# Patient Record
Sex: FEMALE | Race: WHITE | NOT HISPANIC OR LATINO | URBAN - METROPOLITAN AREA
[De-identification: names, ages, dates, MRNs, and addresses within clinical notes are randomized per-mention and may not be internally consistent; named-entity substitution may affect disease eponyms.]

---

## 2017-02-21 ENCOUNTER — APPOINTMENT (RX ONLY)
Dept: URBAN - METROPOLITAN AREA CLINIC 38 | Facility: CLINIC | Age: 47
Setting detail: DERMATOLOGY
End: 2017-02-21

## 2017-02-21 DIAGNOSIS — L57.0 ACTINIC KERATOSIS: ICD-10-CM

## 2017-02-21 DIAGNOSIS — D22 MELANOCYTIC NEVI: ICD-10-CM

## 2017-02-21 DIAGNOSIS — Z80.8 FAMILY HISTORY OF MALIGNANT NEOPLASM OF OTHER ORGANS OR SYSTEMS: ICD-10-CM

## 2017-02-21 DIAGNOSIS — L82.0 INFLAMED SEBORRHEIC KERATOSIS: ICD-10-CM

## 2017-02-21 PROBLEM — D48.5 NEOPLASM OF UNCERTAIN BEHAVIOR OF SKIN: Status: ACTIVE | Noted: 2017-02-21

## 2017-02-21 PROBLEM — D22.5 MELANOCYTIC NEVI OF TRUNK: Status: ACTIVE | Noted: 2017-02-21

## 2017-02-21 PROCEDURE — ? BENIGN DESTRUCTION

## 2017-02-21 PROCEDURE — ? PRODUCT LINE (ANTI-AGING)

## 2017-02-21 PROCEDURE — ? COUNSELING

## 2017-02-21 PROCEDURE — 17000 DESTRUCT PREMALG LESION: CPT | Mod: 59

## 2017-02-21 PROCEDURE — 11100: CPT | Mod: 59

## 2017-02-21 PROCEDURE — 17110 DESTRUCTION B9 LES UP TO 14: CPT

## 2017-02-21 PROCEDURE — ? BIOPSY BY SHAVE METHOD

## 2017-02-21 PROCEDURE — ? LIQUID NITROGEN

## 2017-02-21 PROCEDURE — 99213 OFFICE O/P EST LOW 20 MIN: CPT | Mod: 25

## 2017-02-21 ASSESSMENT — LOCATION SIMPLE DESCRIPTION DERM
LOCATION SIMPLE: LEFT PRETIBIAL REGION
LOCATION SIMPLE: LEFT UPPER BACK
LOCATION SIMPLE: UPPER BACK
LOCATION SIMPLE: LEFT TEMPLE

## 2017-02-21 ASSESSMENT — LOCATION ZONE DERM
LOCATION ZONE: FACE
LOCATION ZONE: LEG
LOCATION ZONE: TRUNK

## 2017-02-21 ASSESSMENT — LOCATION DETAILED DESCRIPTION DERM
LOCATION DETAILED: SUPERIOR THORACIC SPINE
LOCATION DETAILED: LEFT SUPERIOR UPPER BACK
LOCATION DETAILED: LEFT PROXIMAL PRETIBIAL REGION
LOCATION DETAILED: LEFT CENTRAL TEMPLE

## 2017-02-21 NOTE — PROCEDURE: BIOPSY BY SHAVE METHOD
Consent: Verbal consent was obtained and risks were reviewed including but not limited to scarring, infection, bleeding, scabbing, incomplete removal, and allergy to anesthesia.\\n\\n(For billing): It's appropriate to screen patients twice yearly or sometimes more who have a history of skin cancer.\\nThe skin check is a completely separate identifiable service. It stands on its own. Many visits are just for this and\\nno procedures are done. If a procedure is done on a separate lesion that the patient complains about, it is not\\nrelated to the fact that a full skin check would be done anyway because of their higher risk of skin cancer\\nrecurrence or new primary.

## 2017-02-21 NOTE — PROCEDURE: BENIGN DESTRUCTION
Post-Care Instructions: I reviewed with the patient in detail post-care instructions. Patient is to wear sunprotection, and avoid picking at any of the treated lesions. Pt may apply Vaseline to crusted or scabbing areas.
Consent: The patient's consent was obtained including but not limited to risks of crusting, scabbing, blistering, scarring, darker or lighter pigmentary change, recurrence, incomplete removal and infection.
Render Post-Care Instructions In Note?: no
Treatment Number (Will Not Render If 0): 0
Medical Necessity Clause: This procedure was medically necessary because the lesions that were treated were: bleeding
Detail Level: Detailed
Medical Necessity Information: It is in your best interest to select a reason for this procedure from the list below. All of these items fulfill various CMS LCD requirements except the new and changing color options.

## 2017-02-21 NOTE — PROCEDURE: BIOPSY BY SHAVE METHOD
Problem: Inpatient Physical Therapy  Goal: Transfer Training Goal 1 LTG- PT  Outcome: Unable to achieve outcome(s) by discharge Date Met:  01/22/17 01/20/17 0913 01/21/17 1350 01/22/17 1607   Transfer Training PT LTG   Transfer Training PT LTG, Date Established 01/14/17 --  --    Transfer Training PT LTG, Time to Achieve --  --  by discharge   Transfer Training PT LTG, Activity Type all transfers --  --    Transfer Training PT LTG, [place] Level conditional independence --  --    Transfer Training PT LTG, Assist Device walker, rolling --  --    Transfer Training PT LTG, Date Goal Reviewed --  01/21/17 --    Transfer Training PT LTG, Outcome --  --  goal not met   Transfer Training PT LTG, Reason Goal Not Met --  --  discharged from facility       Goal: Gait Training Goal LTG- PT  Outcome: Unable to achieve outcome(s) by discharge Date Met:  01/22/17 01/20/17 0913 01/21/17 1350 01/22/17 1607   Gait Training PT LTG   Gait Training Goal PT LTG, Date Established --  --  01/20/17   Gait Training Goal PT LTG, Time to Achieve --  --  by discharge   Gait Training Goal PT LTG, [place] Level conditional independence --  --    Gait Training Goal PT LTG, Assist Device walker, rolling --  --    Gait Training Goal PT LTG, Distance to Achieve 150 --  --    Gait Training Goal PT LTG, Date Goal Reviewed --  01/21/17 --    Gait Training Goal PT LTG, Outcome --  --  goal not met   Gait Training Goal PT LTG, Reason Goal Not Met --  --  discharged from facility       Goal: Stair Training Goal LTG- PT  Outcome: Unable to achieve outcome(s) by discharge Date Met:  01/22/17 01/14/17 0913 01/21/17 1350 01/22/17 1607   Stair Training PT LTG   Stair Training Goal PT LTG, Date Established 01/14/17 --  --    Stair Training Goal PT LTG, Time to Achieve by discharge --  --    Stair Training Goal PT LTG, Number of Steps 2 --  --    Stair Training Goal PT LTG, [place] Level independent --  --    Stair Training Goal PT LTG,  Assist Device 2 handrails --  --    Stair Training Goal PT LTG, Date Goal Reviewed --  01/21/17 --    Stair Training Goal PT LTG, Outcome --  --  goal not met   Stair Training Goal PT LTG, Reason Goal Not Met --  --  discharged from facility         01/14/17 0913 01/21/17 1350 01/22/17 1607   Stair Training PT LTG   Stair Training Goal PT LTG, Date Established 01/14/17 --  --    Stair Training Goal PT LTG, Time to Achieve by discharge --  --    Stair Training Goal PT LTG, Number of Steps 2 --  --    Stair Training Goal PT LTG, [place] Level independent --  --    Stair Training Goal PT LTG, Assist Device 2 handrails --  --    Stair Training Goal PT LTG, Date Goal Reviewed --  01/21/17 --    Stair Training Goal PT LTG, Outcome --  --  goal not met   Stair Training Goal PT LTG, Reason Goal Not Met --  --  discharged from facility            Curettage Text: The wound bed was treated with curettage after the biopsy was performed.

## 2017-02-21 NOTE — HPI: EVALUATION OF SKIN LESION(S)
Hpi Title: Evaluation of Skin Lesions
How Severe Are Your Spot(S)?: mild
Have Your Spot(S) Been Treated In The Past?: has not been treated
Family Member: Father

## 2018-02-27 ENCOUNTER — APPOINTMENT (RX ONLY)
Dept: URBAN - METROPOLITAN AREA CLINIC 38 | Facility: CLINIC | Age: 48
Setting detail: DERMATOLOGY
End: 2018-02-27

## 2018-02-27 DIAGNOSIS — L82.1 OTHER SEBORRHEIC KERATOSIS: ICD-10-CM

## 2018-02-27 DIAGNOSIS — Z80.8 FAMILY HISTORY OF MALIGNANT NEOPLASM OF OTHER ORGANS OR SYSTEMS: ICD-10-CM

## 2018-02-27 DIAGNOSIS — D22 MELANOCYTIC NEVI: ICD-10-CM

## 2018-02-27 PROBLEM — D22.5 MELANOCYTIC NEVI OF TRUNK: Status: ACTIVE | Noted: 2018-02-27

## 2018-02-27 PROCEDURE — ? COUNSELING

## 2018-02-27 PROCEDURE — 99213 OFFICE O/P EST LOW 20 MIN: CPT

## 2018-02-27 ASSESSMENT — LOCATION DETAILED DESCRIPTION DERM
LOCATION DETAILED: LEFT INFERIOR MEDIAL UPPER BACK
LOCATION DETAILED: SUPERIOR THORACIC SPINE

## 2018-02-27 ASSESSMENT — LOCATION ZONE DERM: LOCATION ZONE: TRUNK

## 2018-02-27 ASSESSMENT — LOCATION SIMPLE DESCRIPTION DERM
LOCATION SIMPLE: LEFT UPPER BACK
LOCATION SIMPLE: UPPER BACK

## 2019-03-05 ENCOUNTER — APPOINTMENT (RX ONLY)
Dept: URBAN - METROPOLITAN AREA CLINIC 38 | Facility: CLINIC | Age: 49
Setting detail: DERMATOLOGY
End: 2019-03-05

## 2019-03-05 DIAGNOSIS — L82.1 OTHER SEBORRHEIC KERATOSIS: ICD-10-CM

## 2019-03-05 DIAGNOSIS — Z80.8 FAMILY HISTORY OF MALIGNANT NEOPLASM OF OTHER ORGANS OR SYSTEMS: ICD-10-CM

## 2019-03-05 DIAGNOSIS — D22 MELANOCYTIC NEVI: ICD-10-CM

## 2019-03-05 PROBLEM — D22.5 MELANOCYTIC NEVI OF TRUNK: Status: ACTIVE | Noted: 2019-03-05

## 2019-03-05 PROCEDURE — ? COUNSELING

## 2019-03-05 PROCEDURE — 99213 OFFICE O/P EST LOW 20 MIN: CPT | Mod: 25

## 2019-03-05 PROCEDURE — ? BIOPSY BY SHAVE METHOD

## 2019-03-05 PROCEDURE — 11102 TANGNTL BX SKIN SINGLE LES: CPT

## 2019-03-05 ASSESSMENT — LOCATION SIMPLE DESCRIPTION DERM
LOCATION SIMPLE: LEFT UPPER BACK
LOCATION SIMPLE: RIGHT UPPER BACK
LOCATION SIMPLE: UPPER BACK

## 2019-03-05 ASSESSMENT — LOCATION DETAILED DESCRIPTION DERM
LOCATION DETAILED: LEFT INFERIOR MEDIAL UPPER BACK
LOCATION DETAILED: RIGHT SUPERIOR UPPER BACK
LOCATION DETAILED: SUPERIOR THORACIC SPINE

## 2019-03-05 ASSESSMENT — LOCATION ZONE DERM: LOCATION ZONE: TRUNK

## 2019-03-05 NOTE — PROCEDURE: BIOPSY BY SHAVE METHOD
Lab: [id number]
Consent: Verbal consent was obtained and risks were reviewed including but not limited to scarring, infection, bleeding, scabbing, incomplete removal, and allergy to anesthesia.\\n\\n(For billing): It's appropriate to screen patients twice yearly or sometimes more who have a history of skin cancer.\\nThe skin check is a completely separate identifiable service. It stands on its own. Many visits are just for this and\\nno procedures are done. If a procedure is done on a separate lesion that the patient complains about, it is not\\nrelated to the fact that a full skin check would be done anyway because of their higher risk of skin cancer\\nrecurrence or new primary.
Biopsy Type: H and E
Destruction After The Procedure: No
Hemostasis: Electrocautery
Anesthesia Type: 1% lidocaine without epinephrine
Electrodesiccation Text: The wound bed was treated with electrodesiccation after the biopsy was performed.
Wound Care: Bacitracin
X Size Of Lesion In Cm: 0
Electrodesiccation And Curettage Text: The wound bed was treated with electrodesiccation and curettage after the biopsy was performed.
Cryotherapy Text: The wound bed was treated with cryotherapy after the biopsy was performed.
Detail Level: Detailed
Silver Nitrate Text: The wound bed was treated with silver nitrate after the biopsy was performed.
Lab Facility: [id number]
Was A Bandage Applied: Yes
Notification Instructions: Patient will be notified of biopsy results. However, patient instructed to call the office if not contacted within 2 weeks.
Post-Care Instructions: I reviewed with the patient in detail post-care instructions. Patient is to keep the biopsy site dry overnight, and then wash and apply bacitracin twice daily until healed. Patient may apply hydrogen peroxide soaks to remove any crusting.
Billing Type: Third-Party Bill
Size Of Lesion In Cm: 0.7
Dressing: bandage
Depth Of Biopsy: dermis
Type Of Destruction Used: Curettage
Anesthesia Volume In Cc (Will Not Render If 0): 0.3
Curettage Text: The wound bed was treated with curettage after the biopsy was performed.

## 2019-03-05 NOTE — HPI: SKIN LESION
Has Your Skin Lesion Been Treated?: not been treated
Is This A New Presentation, Or A Follow-Up?: Skin Lesion
Additional History: Pt would like to discuss if this lesion could get removed.

## 2020-04-15 ENCOUNTER — APPOINTMENT (RX ONLY)
Dept: URBAN - METROPOLITAN AREA CLINIC 38 | Facility: CLINIC | Age: 50
Setting detail: DERMATOLOGY
End: 2020-04-15

## 2020-04-15 DIAGNOSIS — L30.9 DERMATITIS, UNSPECIFIED: ICD-10-CM

## 2020-04-15 PROCEDURE — ? TREATMENT REGIMEN

## 2020-04-15 PROCEDURE — ? COUNSELING

## 2020-04-15 PROCEDURE — 99213 OFFICE O/P EST LOW 20 MIN: CPT | Mod: 95

## 2020-04-15 PROCEDURE — ? PRESCRIPTION

## 2020-04-15 RX ORDER — MOMETASONE FUROATE 1 MG/G
OINTMENT TOPICAL
Qty: 45 | Refills: 1 | Status: ERX

## 2020-04-15 RX ORDER — KETOCONAZOLE 20 MG/G
CREAM TOPICAL
Qty: 60 | Refills: 1 | Status: ERX

## 2020-04-15 ASSESSMENT — LOCATION DETAILED DESCRIPTION DERM
LOCATION DETAILED: LEFT LABIA MAJORA
LOCATION DETAILED: RIGHT LABIA MAJORA

## 2020-04-15 ASSESSMENT — LOCATION ZONE DERM: LOCATION ZONE: VULVA

## 2020-04-15 ASSESSMENT — LOCATION SIMPLE DESCRIPTION DERM: LOCATION SIMPLE: VULVA

## 2020-04-15 NOTE — PROCEDURE: TREATMENT REGIMEN
Plan: Per history and review of pathology from biopsy it appears this is itching caused by an inflammatory response.  Gentle skincare discussed. As anti yeast medications are the on,y thing that has given her relief we will restart that today along with a topical steroid to decrease inflammation and pruritus. I recommend an in person exam as soon as the office reopens post covid 19.
Detail Level: Zone

## 2020-04-15 NOTE — HPI: ITCHING (GENITALS)
How Did The Itching Occur?: sudden in onset (over a period of weeks to a few months)
How Severe Is The Itching?: moderate
Additional History: Tried otc yeast infection cream even though she has no vaginal symptoms.  Saw GYN, they thought maybe stubborn yeast infection. Gave triamcinolone /nystatin cream. Helped but then 3 weeks later recurred. Has come and gone and went back to gyn in January who saw rash on the skin so decided to biopsy to rule out lichen sclerosis. Result: Mild perivascular inflammatory infiltrate (dermatitis or hypersensitivity reaction). Then she tried otc hct which was irritating. Tried Zyrtec, no help. Then tried triamcinolone 0.1%ointment twice daily x 2 weeks, completed two weeks ago. No help\\nThe anti yeast creams seemed to work the best. Swab was negative for yeast.

## 2020-05-29 ENCOUNTER — APPOINTMENT (RX ONLY)
Dept: URBAN - METROPOLITAN AREA CLINIC 38 | Facility: CLINIC | Age: 50
Setting detail: DERMATOLOGY
End: 2020-05-29

## 2020-05-29 DIAGNOSIS — L30.9 DERMATITIS, UNSPECIFIED: ICD-10-CM

## 2020-05-29 DIAGNOSIS — Z80.8 FAMILY HISTORY OF MALIGNANT NEOPLASM OF OTHER ORGANS OR SYSTEMS: ICD-10-CM

## 2020-05-29 DIAGNOSIS — L82.0 INFLAMED SEBORRHEIC KERATOSIS: ICD-10-CM

## 2020-05-29 DIAGNOSIS — Q828 OTHER SPECIFIED ANOMALIES OF SKIN: ICD-10-CM

## 2020-05-29 DIAGNOSIS — Q819 OTHER SPECIFIED ANOMALIES OF SKIN: ICD-10-CM

## 2020-05-29 DIAGNOSIS — D22 MELANOCYTIC NEVI: ICD-10-CM

## 2020-05-29 DIAGNOSIS — L82.1 OTHER SEBORRHEIC KERATOSIS: ICD-10-CM

## 2020-05-29 DIAGNOSIS — Q826 OTHER SPECIFIED ANOMALIES OF SKIN: ICD-10-CM

## 2020-05-29 PROBLEM — L85.8 OTHER SPECIFIED EPIDERMAL THICKENING: Status: ACTIVE | Noted: 2020-05-29

## 2020-05-29 PROBLEM — D22.5 MELANOCYTIC NEVI OF TRUNK: Status: ACTIVE | Noted: 2020-05-29

## 2020-05-29 PROCEDURE — ? PRESCRIPTION

## 2020-05-29 PROCEDURE — ? LIQUID NITROGEN

## 2020-05-29 PROCEDURE — ? ADDITIONAL NOTES

## 2020-05-29 PROCEDURE — 99213 OFFICE O/P EST LOW 20 MIN: CPT | Mod: 25

## 2020-05-29 PROCEDURE — 17110 DESTRUCTION B9 LES UP TO 14: CPT

## 2020-05-29 PROCEDURE — ? COUNSELING

## 2020-05-29 PROCEDURE — ? TREATMENT REGIMEN

## 2020-05-29 RX ORDER — TACROLIMUS 1 MG/G
OINTMENT TOPICAL BID
Qty: 60 | Refills: 3 | Status: ERX

## 2020-05-29 ASSESSMENT — LOCATION SIMPLE DESCRIPTION DERM
LOCATION SIMPLE: LEFT SCALP
LOCATION SIMPLE: LEFT TEMPLE
LOCATION SIMPLE: LEFT UPPER BACK
LOCATION SIMPLE: LEFT THIGH
LOCATION SIMPLE: RIGHT THIGH

## 2020-05-29 ASSESSMENT — LOCATION DETAILED DESCRIPTION DERM
LOCATION DETAILED: LEFT MID TEMPLE
LOCATION DETAILED: LEFT ANTERIOR PROXIMAL THIGH
LOCATION DETAILED: RIGHT ANTERIOR PROXIMAL THIGH
LOCATION DETAILED: LEFT SUPERIOR UPPER BACK
LOCATION DETAILED: LEFT CENTRAL FRONTAL SCALP
LOCATION DETAILED: LEFT INFERIOR UPPER BACK

## 2020-05-29 ASSESSMENT — LOCATION ZONE DERM
LOCATION ZONE: LEG
LOCATION ZONE: SCALP
LOCATION ZONE: TRUNK
LOCATION ZONE: FACE

## 2020-05-29 NOTE — PROCEDURE: TREATMENT REGIMEN
Detail Level: Zone
Plan: Steroids have not been helpful. Ketoconazole gives no long term help. Trial of protopic ointment bid as part of this is likely irritant dermatitis as she has IBS and is wiping a lot.

## 2020-10-19 ENCOUNTER — APPOINTMENT (RX ONLY)
Dept: URBAN - METROPOLITAN AREA CLINIC 38 | Facility: CLINIC | Age: 50
Setting detail: DERMATOLOGY
End: 2020-10-19

## 2020-10-19 DIAGNOSIS — L30.9 DERMATITIS, UNSPECIFIED: ICD-10-CM

## 2020-10-19 PROCEDURE — ? ADDITIONAL NOTES

## 2020-10-19 PROCEDURE — ? COUNSELING

## 2020-10-19 PROCEDURE — ? PRESCRIPTION

## 2020-10-19 PROCEDURE — ? OTHER

## 2020-10-19 PROCEDURE — 99213 OFFICE O/P EST LOW 20 MIN: CPT | Mod: 95

## 2020-10-19 RX ORDER — IODOQUINOL, HYDROCORTISONE ACETATE AND ALOE VERA LEAF 10; 20; 10 MG/G; MG/G; MG/G
GEL TOPICAL
Qty: 1 | Refills: 3 | Status: ERX

## 2020-10-19 ASSESSMENT — LOCATION SIMPLE DESCRIPTION DERM
LOCATION SIMPLE: RIGHT INGUINAL FOLD
LOCATION SIMPLE: LEFT INGUINAL FOLD

## 2020-10-19 ASSESSMENT — LOCATION DETAILED DESCRIPTION DERM
LOCATION DETAILED: RIGHT INGUINAL FOLD
LOCATION DETAILED: LEFT INGUINAL FOLD

## 2020-10-19 ASSESSMENT — LOCATION ZONE DERM: LOCATION ZONE: LEG

## 2020-10-19 NOTE — HPI: RASH
Is This A New Presentation, Or A Follow-Up?: Follow Up Rash
Additional History: She’s been using tacrolimus bid, Aquaphor throughout the day. She is changing underwear frequently but it still so itchy.

## 2020-10-19 NOTE — PROCEDURE: OTHER
Other (Free Text): Can alternate in ketoconazole bc it has helped in the past, although there has been no evidence of yeast in the past, she already has the medication at home. Trial of A&D ointment throughout the day to protect the skin
Detail Level: Detailed
Note Text (......Xxx Chief Complaint.): This diagnosis correlates with the

## 2020-11-04 ENCOUNTER — RX ONLY (OUTPATIENT)
Age: 50
Setting detail: RX ONLY
End: 2020-11-04

## 2020-11-04 RX ORDER — HYDROCORTISONE AND IODOCHLORHYDROXYQUIN 5; 30 MG/G; MG/G
CREAM TOPICAL
Qty: 1 | Refills: 1 | Status: ERX

## 2021-05-10 ENCOUNTER — PREPPED CHART (OUTPATIENT)
Dept: URBAN - METROPOLITAN AREA CLINIC 101 | Facility: CLINIC | Age: 51
End: 2021-05-10

## 2021-05-10 PROBLEM — H43.393 VITREOUS OPACITIES: Noted: 2021-05-10

## 2021-05-10 PROBLEM — Z83.518 FAMILY HISTORY OF OTHER SPECIFIED EYE DISORDER: Noted: 2021-05-10

## 2021-05-10 PROBLEM — H35.3132 DRY AMD, INTERMEDIATE DRY STAGE: Noted: 2021-05-10

## 2021-05-10 PROBLEM — H35.713 CENTRAL SEROUS RETINOPATHY: Noted: 2021-05-10

## 2021-05-10 PROBLEM — H35.3131 DRY AMD, EARLY DRY STAGE: Noted: 2021-05-10

## 2021-05-10 PROBLEM — H35.713 CENTRAL SEROUS CHORIORETINOPATHY: Noted: 2021-05-10

## 2021-05-10 PROBLEM — H35.3122 DRY AMD, INTERMEDIATE DRY STAGE: Noted: 2021-05-10

## 2021-07-06 ENCOUNTER — APPOINTMENT (RX ONLY)
Dept: URBAN - METROPOLITAN AREA CLINIC 38 | Facility: CLINIC | Age: 51
Setting detail: DERMATOLOGY
End: 2021-07-06

## 2021-07-06 DIAGNOSIS — D18.0 HEMANGIOMA: ICD-10-CM

## 2021-07-06 DIAGNOSIS — L82.1 OTHER SEBORRHEIC KERATOSIS: ICD-10-CM

## 2021-07-06 DIAGNOSIS — D22 MELANOCYTIC NEVI: ICD-10-CM

## 2021-07-06 DIAGNOSIS — Z80.8 FAMILY HISTORY OF MALIGNANT NEOPLASM OF OTHER ORGANS OR SYSTEMS: ICD-10-CM

## 2021-07-06 PROBLEM — D22.5 MELANOCYTIC NEVI OF TRUNK: Status: ACTIVE | Noted: 2021-07-06

## 2021-07-06 PROBLEM — D18.01 HEMANGIOMA OF SKIN AND SUBCUTANEOUS TISSUE: Status: ACTIVE | Noted: 2021-07-06

## 2021-07-06 PROCEDURE — 99213 OFFICE O/P EST LOW 20 MIN: CPT | Mod: 25

## 2021-07-06 PROCEDURE — ? TREATMENT REGIMEN

## 2021-07-06 PROCEDURE — 11300 SHAVE SKIN LESION 0.5 CM/<: CPT | Mod: 76

## 2021-07-06 PROCEDURE — ? FULL BODY SKIN EXAM

## 2021-07-06 PROCEDURE — ? COUNSELING

## 2021-07-06 PROCEDURE — ? SHAVE REMOVAL (NO PATHOLOGY)

## 2021-07-06 PROCEDURE — ? ADDITIONAL NOTES

## 2021-07-06 PROCEDURE — 11301 SHAVE SKIN LESION 0.6-1.0 CM: CPT

## 2021-07-06 PROCEDURE — 11300 SHAVE SKIN LESION 0.5 CM/<: CPT

## 2021-07-06 ASSESSMENT — LOCATION DETAILED DESCRIPTION DERM
LOCATION DETAILED: LEFT DISTAL CALF
LOCATION DETAILED: LEFT POSTERIOR ANKLE
LOCATION DETAILED: RIGHT INFERIOR MEDIAL UPPER BACK
LOCATION DETAILED: LEFT RIB CAGE
LOCATION DETAILED: LEFT MEDIAL BREAST 11-12:00 REGION
LOCATION DETAILED: LEFT SUPERIOR MEDIAL MIDBACK

## 2021-07-06 ASSESSMENT — LOCATION SIMPLE DESCRIPTION DERM
LOCATION SIMPLE: LEFT BREAST
LOCATION SIMPLE: LEFT ANKLE
LOCATION SIMPLE: LEFT LOWER BACK
LOCATION SIMPLE: LEFT CALF
LOCATION SIMPLE: RIGHT UPPER BACK
LOCATION SIMPLE: ABDOMEN

## 2021-07-06 ASSESSMENT — LOCATION ZONE DERM
LOCATION ZONE: TRUNK
LOCATION ZONE: LEG

## 2021-07-06 NOTE — PROCEDURE: SHAVE REMOVAL (NO PATHOLOGY)
Medical Necessity Information: It is in your best interest to select a reason for this procedure from the list below. All of these items fulfill various CMS LCD requirements except the new and changing color options.
Include Z78.9 (Other Specified Conditions Influencing Health Status) As An Associated Diagnosis?: No
Medical Necessity Clause: This procedure was medically necessary because the lesion that was treated was:bleeding
Detail Level: Detailed
Size Of Lesion In Cm: 0.4
X Size Of Lesion In Cm (Optional): 0
Anesthesia Type: 1% lidocaine with epinephrine
Anesthesia Volume In Cc: 0.1
Hemostasis: Electrocautery
Wound Care: Petrolatum
Path Notes (To The Dermatopathologist): Please check margins.
Consent was obtained from the patient. The risks and benefits to therapy were discussed in detail. Specifically, the risks of infection, scarring, bleeding, prolonged wound healing, incomplete removal, allergy to anesthesia, nerve injury and recurrence were addressed. Prior to the procedure, the treatment site was clearly identified and confirmed by the patient. All components of Universal Protocol/PAUSE Rule completed.
Post-Care Instructions: I reviewed with the patient in detail post-care instructions. Patient is to keep the biopsy site dry overnight, and then apply bacitracin twice daily until healed. Patient may apply hydrogen peroxide soaks to remove any crusting.
Size Of Lesion In Cm: 0.5
Size Of Lesion In Cm: 0.6

## 2021-11-08 ENCOUNTER — RX ONLY (OUTPATIENT)
Age: 51
Setting detail: RX ONLY
End: 2021-11-08

## 2021-11-08 RX ORDER — NYSTATIN AND TRIAMCINOLONE ACETONIDE 100000; 1 [USP'U]/G; MG/G
CREAM TOPICAL
Qty: 60 | Refills: 1 | Status: ERX

## 2022-04-12 NOTE — PROCEDURE: ADDITIONAL NOTES
Addended by: BOZENA THOMPSON on: 4/12/2022 04:02 PM     Modules accepted: Level of Service    
Detail Level: Simple
Additional Notes: Patient consent was obtained to proceed with the visit and recommended plan of care after discussion of all risks and benefits, including the risks of COVID-19 exposure.

## 2022-05-06 ASSESSMENT — TONOMETRY
OD_IOP_MMHG: 13
OS_IOP_MMHG: 13

## 2022-05-06 ASSESSMENT — VISUAL ACUITY
OD_SC: 20/20-2
OS_SC: 20/20-2

## 2022-05-16 ENCOUNTER — 1 YEAR COMPLETE EXAM (OUTPATIENT)
Dept: URBAN - METROPOLITAN AREA CLINIC 101 | Facility: CLINIC | Age: 52
End: 2022-05-16

## 2022-05-16 DIAGNOSIS — H35.713: ICD-10-CM

## 2022-05-16 DIAGNOSIS — H35.3131: ICD-10-CM

## 2022-05-16 PROCEDURE — 92134 CPTRZ OPH DX IMG PST SGM RTA: CPT

## 2022-05-16 PROCEDURE — 92202 OPSCPY EXTND ON/MAC DRAW: CPT

## 2022-05-16 PROCEDURE — 92014 COMPRE OPH EXAM EST PT 1/>: CPT

## 2022-05-16 ASSESSMENT — TONOMETRY
OD_IOP_MMHG: 17
OS_IOP_MMHG: 14

## 2022-05-16 ASSESSMENT — VISUAL ACUITY
OS_CC: 20/20
OD_CC: 20/20

## 2022-07-20 ENCOUNTER — APPOINTMENT (RX ONLY)
Dept: URBAN - METROPOLITAN AREA CLINIC 38 | Facility: CLINIC | Age: 52
Setting detail: DERMATOLOGY
End: 2022-07-20

## 2022-07-20 DIAGNOSIS — N94.818 OTHER VULVODYNIA: ICD-10-CM

## 2022-07-20 DIAGNOSIS — Z80.8 FAMILY HISTORY OF MALIGNANT NEOPLASM OF OTHER ORGANS OR SYSTEMS: ICD-10-CM

## 2022-07-20 DIAGNOSIS — L81.4 OTHER MELANIN HYPERPIGMENTATION: ICD-10-CM

## 2022-07-20 DIAGNOSIS — L84 CORNS AND CALLOSITIES: ICD-10-CM

## 2022-07-20 DIAGNOSIS — D22 MELANOCYTIC NEVI: ICD-10-CM

## 2022-07-20 PROBLEM — D22.72 MELANOCYTIC NEVI OF LEFT LOWER LIMB, INCLUDING HIP: Status: ACTIVE | Noted: 2022-07-20

## 2022-07-20 PROBLEM — D22.5 MELANOCYTIC NEVI OF TRUNK: Status: ACTIVE | Noted: 2022-07-20

## 2022-07-20 PROCEDURE — 99213 OFFICE O/P EST LOW 20 MIN: CPT

## 2022-07-20 PROCEDURE — ? TREATMENT REGIMEN

## 2022-07-20 PROCEDURE — ? FULL BODY SKIN EXAM

## 2022-07-20 PROCEDURE — ? ADDITIONAL NOTES

## 2022-07-20 PROCEDURE — ? COUNSELING

## 2022-07-20 ASSESSMENT — LOCATION ZONE DERM
LOCATION ZONE: FACE
LOCATION ZONE: LEG
LOCATION ZONE: VULVA
LOCATION ZONE: TOE
LOCATION ZONE: TRUNK

## 2022-07-20 ASSESSMENT — LOCATION SIMPLE DESCRIPTION DERM
LOCATION SIMPLE: LEFT 5TH TOE
LOCATION SIMPLE: LEFT CHEEK
LOCATION SIMPLE: VULVA
LOCATION SIMPLE: ABDOMEN
LOCATION SIMPLE: LEFT POSTERIOR THIGH
LOCATION SIMPLE: RIGHT UPPER BACK
LOCATION SIMPLE: RIGHT CHEEK
LOCATION SIMPLE: RIGHT FOREHEAD

## 2022-07-20 ASSESSMENT — LOCATION DETAILED DESCRIPTION DERM
LOCATION DETAILED: RIGHT SUPERIOR UPPER BACK
LOCATION DETAILED: RIGHT CENTRAL MALAR CHEEK
LOCATION DETAILED: LEFT LATERAL ABDOMEN
LOCATION DETAILED: LEFT INFERIOR CENTRAL MALAR CHEEK
LOCATION DETAILED: LEFT LABIA MAJORA
LOCATION DETAILED: LEFT LATERAL 5TH TOE
LOCATION DETAILED: LEFT DISTAL POSTERIOR THIGH
LOCATION DETAILED: RIGHT MEDIAL FOREHEAD

## 2022-08-12 ENCOUNTER — FOLLOW UP (OUTPATIENT)
Dept: URBAN - METROPOLITAN AREA CLINIC 101 | Facility: CLINIC | Age: 52
End: 2022-08-12

## 2022-08-12 DIAGNOSIS — H35.713: ICD-10-CM

## 2022-08-12 DIAGNOSIS — H35.3131: ICD-10-CM

## 2022-08-12 PROCEDURE — 92014 COMPRE OPH EXAM EST PT 1/>: CPT

## 2022-08-12 PROCEDURE — 92134 CPTRZ OPH DX IMG PST SGM RTA: CPT

## 2022-08-12 PROCEDURE — 92202 OPSCPY EXTND ON/MAC DRAW: CPT

## 2022-08-12 ASSESSMENT — VISUAL ACUITY
OS_CC: 20/20
OD_CC: 20/20

## 2022-08-12 ASSESSMENT — TONOMETRY
OD_IOP_MMHG: 17
OS_IOP_MMHG: 17

## 2022-09-13 ENCOUNTER — FOLLOW UP (OUTPATIENT)
Dept: URBAN - METROPOLITAN AREA CLINIC 101 | Facility: CLINIC | Age: 52
End: 2022-09-13

## 2022-09-13 DIAGNOSIS — H35.3132: ICD-10-CM

## 2022-09-13 DIAGNOSIS — H35.051: ICD-10-CM

## 2022-09-13 DIAGNOSIS — H35.713: ICD-10-CM

## 2022-09-13 PROCEDURE — 92014 COMPRE OPH EXAM EST PT 1/>: CPT | Mod: 25

## 2022-09-13 PROCEDURE — 67028 INJECTION EYE DRUG: CPT

## 2022-09-13 PROCEDURE — 92202 OPSCPY EXTND ON/MAC DRAW: CPT | Mod: 59

## 2022-09-13 PROCEDURE — 92134 CPTRZ OPH DX IMG PST SGM RTA: CPT

## 2022-09-13 ASSESSMENT — TONOMETRY
OS_IOP_MMHG: 21
OD_IOP_MMHG: 23
OD_IOP_MMHG: 22

## 2022-09-13 ASSESSMENT — VISUAL ACUITY
OS_CC: 20/20
OD_CC: 20/20

## 2022-10-11 ENCOUNTER — FOLLOW UP (OUTPATIENT)
Dept: URBAN - METROPOLITAN AREA CLINIC 101 | Facility: CLINIC | Age: 52
End: 2022-10-11

## 2022-10-11 DIAGNOSIS — H35.3132: ICD-10-CM

## 2022-10-11 DIAGNOSIS — H35.051: ICD-10-CM

## 2022-10-11 DIAGNOSIS — H35.713: ICD-10-CM

## 2022-10-11 PROCEDURE — 92202 OPSCPY EXTND ON/MAC DRAW: CPT | Mod: 59

## 2022-10-11 PROCEDURE — 92134 CPTRZ OPH DX IMG PST SGM RTA: CPT

## 2022-10-11 PROCEDURE — 92014 COMPRE OPH EXAM EST PT 1/>: CPT | Mod: 25

## 2022-10-11 PROCEDURE — 67028 INJECTION EYE DRUG: CPT

## 2022-10-11 ASSESSMENT — VISUAL ACUITY
OS_CC: 20/20
OD_CC: 20/20

## 2022-10-11 ASSESSMENT — TONOMETRY
OS_IOP_MMHG: 16
OD_IOP_MMHG: 16

## 2022-11-14 ENCOUNTER — FOLLOW UP (OUTPATIENT)
Dept: URBAN - METROPOLITAN AREA CLINIC 101 | Facility: CLINIC | Age: 52
End: 2022-11-14

## 2022-11-14 DIAGNOSIS — H35.3132: ICD-10-CM

## 2022-11-14 DIAGNOSIS — H35.051: ICD-10-CM

## 2022-11-14 DIAGNOSIS — H35.713: ICD-10-CM

## 2022-11-14 PROCEDURE — 92014 COMPRE OPH EXAM EST PT 1/>: CPT | Mod: 25

## 2022-11-14 PROCEDURE — 92201 OPSCPY EXTND RTA DRAW UNI/BI: CPT | Mod: 59

## 2022-11-14 PROCEDURE — 67028 INJECTION EYE DRUG: CPT

## 2022-11-14 PROCEDURE — 92134 CPTRZ OPH DX IMG PST SGM RTA: CPT

## 2022-11-14 ASSESSMENT — VISUAL ACUITY
OS_CC: 20/20
OD_CC: 20/20

## 2022-11-14 ASSESSMENT — TONOMETRY
OS_IOP_MMHG: 14
OD_IOP_MMHG: 12

## 2022-12-16 ENCOUNTER — FOLLOW UP (OUTPATIENT)
Dept: URBAN - METROPOLITAN AREA CLINIC 101 | Facility: CLINIC | Age: 52
End: 2022-12-16

## 2022-12-16 DIAGNOSIS — H35.713: ICD-10-CM

## 2022-12-16 DIAGNOSIS — H35.3132: ICD-10-CM

## 2022-12-16 DIAGNOSIS — H35.051: ICD-10-CM

## 2022-12-16 PROCEDURE — 92014 COMPRE OPH EXAM EST PT 1/>: CPT

## 2022-12-16 PROCEDURE — 92134 CPTRZ OPH DX IMG PST SGM RTA: CPT

## 2022-12-16 PROCEDURE — 92202 OPSCPY EXTND ON/MAC DRAW: CPT

## 2022-12-16 ASSESSMENT — TONOMETRY
OD_IOP_MMHG: 19
OS_IOP_MMHG: 15

## 2022-12-16 ASSESSMENT — VISUAL ACUITY
OS_CC: 20/20
OD_CC: 20/20-1

## 2023-01-19 ENCOUNTER — FOLLOW UP (OUTPATIENT)
Dept: URBAN - METROPOLITAN AREA CLINIC 101 | Facility: CLINIC | Age: 53
End: 2023-01-19

## 2023-01-19 DIAGNOSIS — H35.713: ICD-10-CM

## 2023-01-19 DIAGNOSIS — H35.3132: ICD-10-CM

## 2023-01-19 DIAGNOSIS — H35.051: ICD-10-CM

## 2023-01-19 PROCEDURE — 67028 INJECTION EYE DRUG: CPT

## 2023-01-19 PROCEDURE — 92134 CPTRZ OPH DX IMG PST SGM RTA: CPT

## 2023-01-19 PROCEDURE — 92202 OPSCPY EXTND ON/MAC DRAW: CPT | Mod: 59

## 2023-01-19 PROCEDURE — 92014 COMPRE OPH EXAM EST PT 1/>: CPT | Mod: 25

## 2023-01-19 ASSESSMENT — TONOMETRY
OS_IOP_MMHG: 16
OD_IOP_MMHG: 14

## 2023-01-19 ASSESSMENT — VISUAL ACUITY
OD_SC: 20/20
OS_SC: 20/20

## 2023-02-17 ENCOUNTER — FOLLOW UP (OUTPATIENT)
Dept: URBAN - METROPOLITAN AREA CLINIC 101 | Facility: CLINIC | Age: 53
End: 2023-02-17

## 2023-02-17 DIAGNOSIS — H35.3132: ICD-10-CM

## 2023-02-17 DIAGNOSIS — H35.713: ICD-10-CM

## 2023-02-17 DIAGNOSIS — H35.051: ICD-10-CM

## 2023-02-17 PROCEDURE — 92134 CPTRZ OPH DX IMG PST SGM RTA: CPT

## 2023-02-17 PROCEDURE — 92202 OPSCPY EXTND ON/MAC DRAW: CPT

## 2023-02-17 PROCEDURE — 92014 COMPRE OPH EXAM EST PT 1/>: CPT

## 2023-02-17 ASSESSMENT — TONOMETRY
OD_IOP_MMHG: 14
OS_IOP_MMHG: 16

## 2023-02-17 ASSESSMENT — VISUAL ACUITY
OD_CC: 20/20
OS_CC: 20/20

## 2023-02-24 ENCOUNTER — RX ONLY (OUTPATIENT)
Age: 53
Setting detail: RX ONLY
End: 2023-02-24

## 2023-02-24 RX ORDER — NYSTATIN AND TRIAMCINOLONE ACETONIDE 100000; 1 [USP'U]/G; MG/G
CREAM TOPICAL
Qty: 60 | Refills: 1 | Status: ERX

## 2023-03-20 ENCOUNTER — FOLLOW UP (OUTPATIENT)
Dept: URBAN - METROPOLITAN AREA CLINIC 101 | Facility: CLINIC | Age: 53
End: 2023-03-20

## 2023-03-20 DIAGNOSIS — H35.713: ICD-10-CM

## 2023-03-20 DIAGNOSIS — H35.3132: ICD-10-CM

## 2023-03-20 DIAGNOSIS — H35.051: ICD-10-CM

## 2023-03-20 PROCEDURE — 92134 CPTRZ OPH DX IMG PST SGM RTA: CPT

## 2023-03-20 PROCEDURE — 92014 COMPRE OPH EXAM EST PT 1/>: CPT

## 2023-03-20 PROCEDURE — 92202 OPSCPY EXTND ON/MAC DRAW: CPT

## 2023-03-20 ASSESSMENT — VISUAL ACUITY
OS_CC: 20/20
OD_CC: 20/20

## 2023-03-20 ASSESSMENT — TONOMETRY
OS_IOP_MMHG: 12
OD_IOP_MMHG: 13

## 2023-04-17 ENCOUNTER — FOLLOW UP (OUTPATIENT)
Dept: URBAN - METROPOLITAN AREA CLINIC 101 | Facility: CLINIC | Age: 53
End: 2023-04-17

## 2023-04-17 DIAGNOSIS — H35.3132: ICD-10-CM

## 2023-04-17 DIAGNOSIS — H35.051: ICD-10-CM

## 2023-04-17 DIAGNOSIS — H35.713: ICD-10-CM

## 2023-04-17 PROCEDURE — 92202 OPSCPY EXTND ON/MAC DRAW: CPT

## 2023-04-17 PROCEDURE — 92014 COMPRE OPH EXAM EST PT 1/>: CPT

## 2023-04-17 PROCEDURE — 92134 CPTRZ OPH DX IMG PST SGM RTA: CPT

## 2023-04-17 ASSESSMENT — VISUAL ACUITY
OD_CC: 20/20
OS_CC: 20/20

## 2023-04-17 ASSESSMENT — TONOMETRY
OD_IOP_MMHG: 12
OS_IOP_MMHG: 13

## 2023-06-23 ENCOUNTER — FOLLOW UP (OUTPATIENT)
Dept: URBAN - METROPOLITAN AREA CLINIC 101 | Facility: CLINIC | Age: 53
End: 2023-06-23

## 2023-06-23 DIAGNOSIS — H35.713: ICD-10-CM

## 2023-06-23 DIAGNOSIS — H35.3132: ICD-10-CM

## 2023-06-23 DIAGNOSIS — H35.051: ICD-10-CM

## 2023-06-23 PROCEDURE — 92014 COMPRE OPH EXAM EST PT 1/>: CPT

## 2023-06-23 PROCEDURE — 92202 OPSCPY EXTND ON/MAC DRAW: CPT

## 2023-06-23 PROCEDURE — 92134 CPTRZ OPH DX IMG PST SGM RTA: CPT

## 2023-06-23 ASSESSMENT — VISUAL ACUITY
OD_CC: 20/20
OS_CC: 20/20

## 2023-06-23 ASSESSMENT — TONOMETRY
OS_IOP_MMHG: 16
OD_IOP_MMHG: 15

## 2023-08-21 ENCOUNTER — FOLLOW UP (OUTPATIENT)
Dept: URBAN - METROPOLITAN AREA CLINIC 101 | Facility: CLINIC | Age: 53
End: 2023-08-21

## 2023-08-21 DIAGNOSIS — H35.713: ICD-10-CM

## 2023-08-21 DIAGNOSIS — H35.051: ICD-10-CM

## 2023-08-21 DIAGNOSIS — H35.3132: ICD-10-CM

## 2023-08-21 PROCEDURE — 92202 OPSCPY EXTND ON/MAC DRAW: CPT

## 2023-08-21 PROCEDURE — 92134 CPTRZ OPH DX IMG PST SGM RTA: CPT

## 2023-08-21 PROCEDURE — 92014 COMPRE OPH EXAM EST PT 1/>: CPT

## 2023-08-21 ASSESSMENT — TONOMETRY
OS_IOP_MMHG: 17
OD_IOP_MMHG: 16

## 2023-08-21 ASSESSMENT — VISUAL ACUITY
OS_CC: 20/20
OD_CC: 20/20

## 2023-11-13 ENCOUNTER — FOLLOW UP (OUTPATIENT)
Dept: URBAN - METROPOLITAN AREA CLINIC 101 | Facility: CLINIC | Age: 53
End: 2023-11-13

## 2023-11-13 DIAGNOSIS — H35.713: ICD-10-CM

## 2023-11-13 DIAGNOSIS — H35.3132: ICD-10-CM

## 2023-11-13 DIAGNOSIS — H35.051: ICD-10-CM

## 2023-11-13 PROCEDURE — 92014 COMPRE OPH EXAM EST PT 1/>: CPT

## 2023-11-13 PROCEDURE — 92134 CPTRZ OPH DX IMG PST SGM RTA: CPT

## 2023-11-13 PROCEDURE — 92202 OPSCPY EXTND ON/MAC DRAW: CPT

## 2023-11-13 ASSESSMENT — TONOMETRY
OD_IOP_MMHG: 14
OS_IOP_MMHG: 13

## 2023-11-13 ASSESSMENT — VISUAL ACUITY
OS_PH: 20/20-1
OS_SC: 20/30-2
OD_SC: 20/30-2
OD_PH: 20/20-1

## 2024-01-09 ENCOUNTER — APPOINTMENT (RX ONLY)
Dept: URBAN - METROPOLITAN AREA CLINIC 38 | Facility: CLINIC | Age: 54
Setting detail: DERMATOLOGY
End: 2024-01-09

## 2024-01-09 DIAGNOSIS — D22 MELANOCYTIC NEVI: ICD-10-CM

## 2024-01-09 DIAGNOSIS — D18.0 HEMANGIOMA: ICD-10-CM

## 2024-01-09 PROBLEM — D22.72 MELANOCYTIC NEVI OF LEFT LOWER LIMB, INCLUDING HIP: Status: ACTIVE | Noted: 2024-01-09

## 2024-01-09 PROBLEM — D22.62 MELANOCYTIC NEVI OF LEFT UPPER LIMB, INCLUDING SHOULDER: Status: ACTIVE | Noted: 2024-01-09

## 2024-01-09 PROBLEM — D18.01 HEMANGIOMA OF SKIN AND SUBCUTANEOUS TISSUE: Status: ACTIVE | Noted: 2024-01-09

## 2024-01-09 PROBLEM — D22.39 MELANOCYTIC NEVI OF OTHER PARTS OF FACE: Status: ACTIVE | Noted: 2024-01-09

## 2024-01-09 PROBLEM — D22.5 MELANOCYTIC NEVI OF TRUNK: Status: ACTIVE | Noted: 2024-01-09

## 2024-01-09 PROCEDURE — 99213 OFFICE O/P EST LOW 20 MIN: CPT | Mod: 25

## 2024-01-09 PROCEDURE — ? TREATMENT REGIMEN

## 2024-01-09 PROCEDURE — ? FULL BODY SKIN EXAM

## 2024-01-09 PROCEDURE — ? COUNSELING

## 2024-01-09 PROCEDURE — ? BIOPSY BY SHAVE METHOD

## 2024-01-09 PROCEDURE — 11102 TANGNTL BX SKIN SINGLE LES: CPT

## 2024-01-09 ASSESSMENT — LOCATION ZONE DERM
LOCATION ZONE: ARM
LOCATION ZONE: LEG
LOCATION ZONE: TRUNK
LOCATION ZONE: FACE

## 2024-01-09 ASSESSMENT — LOCATION SIMPLE DESCRIPTION DERM
LOCATION SIMPLE: LEFT TEMPLE
LOCATION SIMPLE: LEFT FOREARM
LOCATION SIMPLE: ABDOMEN
LOCATION SIMPLE: LEFT THIGH
LOCATION SIMPLE: LEFT UPPER BACK
LOCATION SIMPLE: RIGHT LOWER BACK

## 2024-01-09 ASSESSMENT — LOCATION DETAILED DESCRIPTION DERM
LOCATION DETAILED: LEFT LATERAL ABDOMEN
LOCATION DETAILED: RIGHT SUPERIOR LATERAL MIDBACK
LOCATION DETAILED: LEFT SUPERIOR LATERAL UPPER BACK
LOCATION DETAILED: LEFT RIB CAGE
LOCATION DETAILED: LEFT CENTRAL TEMPLE
LOCATION DETAILED: LEFT ANTERIOR PROXIMAL THIGH
LOCATION DETAILED: LEFT PROXIMAL DORSAL FOREARM

## 2024-01-09 NOTE — HPI: SKIN LESIONS
Is This A New Presentation, Or A Follow-Up?: Skin Lesions
Additional History: Red dots appearing more and more

## 2024-03-01 ENCOUNTER — FOLLOW UP (OUTPATIENT)
Dept: URBAN - METROPOLITAN AREA CLINIC 101 | Facility: CLINIC | Age: 54
End: 2024-03-01

## 2024-03-01 DIAGNOSIS — H43.393: ICD-10-CM

## 2024-03-01 DIAGNOSIS — H35.051: ICD-10-CM

## 2024-03-01 DIAGNOSIS — H35.713: ICD-10-CM

## 2024-03-01 DIAGNOSIS — H35.3132: ICD-10-CM

## 2024-03-01 PROCEDURE — 92014 COMPRE OPH EXAM EST PT 1/>: CPT

## 2024-03-01 PROCEDURE — 92134 CPTRZ OPH DX IMG PST SGM RTA: CPT

## 2024-03-01 PROCEDURE — 92202 OPSCPY EXTND ON/MAC DRAW: CPT

## 2024-03-01 ASSESSMENT — VISUAL ACUITY
OD_CC: 20/20
OS_CC: 20/20

## 2024-03-01 ASSESSMENT — TONOMETRY
OS_IOP_MMHG: 18
OD_IOP_MMHG: 19

## 2024-07-01 ENCOUNTER — FOLLOW UP (OUTPATIENT)
Dept: URBAN - METROPOLITAN AREA CLINIC 101 | Facility: CLINIC | Age: 54
End: 2024-07-01

## 2024-07-01 DIAGNOSIS — H35.713: ICD-10-CM

## 2024-07-01 DIAGNOSIS — H43.393: ICD-10-CM

## 2024-07-01 DIAGNOSIS — H35.3132: ICD-10-CM

## 2024-07-01 DIAGNOSIS — H35.051: ICD-10-CM

## 2024-07-01 PROCEDURE — 92202 OPSCPY EXTND ON/MAC DRAW: CPT

## 2024-07-01 PROCEDURE — 92134 CPTRZ OPH DX IMG PST SGM RTA: CPT

## 2024-07-01 PROCEDURE — 92014 COMPRE OPH EXAM EST PT 1/>: CPT

## 2024-07-01 ASSESSMENT — TONOMETRY
OS_IOP_MMHG: 16
OD_IOP_MMHG: 16

## 2024-07-01 ASSESSMENT — VISUAL ACUITY
OS_CC: 20/20
OD_CC: 20/20

## 2024-08-15 ENCOUNTER — APPOINTMENT (RX ONLY)
Dept: URBAN - METROPOLITAN AREA CLINIC 38 | Facility: CLINIC | Age: 54
Setting detail: DERMATOLOGY
End: 2024-08-15

## 2024-08-15 DIAGNOSIS — L91.8 OTHER HYPERTROPHIC DISORDERS OF THE SKIN: ICD-10-CM

## 2024-08-15 DIAGNOSIS — L82.0 INFLAMED SEBORRHEIC KERATOSIS: ICD-10-CM

## 2024-08-15 PROCEDURE — ? COUNSELING

## 2024-08-15 PROCEDURE — 17110 DESTRUCTION B9 LES UP TO 14: CPT

## 2024-08-15 PROCEDURE — ? BENIGN DESTRUCTION

## 2024-08-15 PROCEDURE — 99212 OFFICE O/P EST SF 10 MIN: CPT | Mod: 25

## 2024-08-15 ASSESSMENT — LOCATION DETAILED DESCRIPTION DERM
LOCATION DETAILED: LEFT LATERAL SUPERIOR CHEST
LOCATION DETAILED: LEFT INFERIOR LATERAL NECK
LOCATION DETAILED: RIGHT SUPERIOR FOREHEAD

## 2024-08-15 ASSESSMENT — LOCATION ZONE DERM
LOCATION ZONE: NECK
LOCATION ZONE: FACE
LOCATION ZONE: TRUNK

## 2024-08-15 ASSESSMENT — LOCATION SIMPLE DESCRIPTION DERM
LOCATION SIMPLE: RIGHT FOREHEAD
LOCATION SIMPLE: CHEST
LOCATION SIMPLE: LEFT ANTERIOR NECK

## 2024-08-15 NOTE — PROCEDURE: BENIGN DESTRUCTION
Render Post-Care Instructions In Note?: no
Treatment Number (Will Not Render If 0): 0
Post-Care Instructions: I reviewed with the patient in detail post-care instructions. Patient is to wear sunprotection, and avoid picking at any of the treated lesions. Pt may apply Vaseline to crusted or scabbing areas.
Detail Level: Detailed
Medical Necessity Information: It is in your best interest to select a reason for this procedure from the list below. All of these items fulfill various CMS LCD requirements except the new and changing color options.
Consent: The patient's consent was obtained including but not limited to risks of crusting, scabbing, blistering, scarring, darker or lighter pigmentary change, recurrence, incomplete removal and infection.
Medical Necessity Clause: This procedure was medically necessary because the lesions that were treated were:
Anesthesia Volume In Cc: 0.7

## 2024-11-18 ENCOUNTER — FOLLOW UP (OUTPATIENT)
Dept: URBAN - METROPOLITAN AREA CLINIC 101 | Facility: CLINIC | Age: 54
End: 2024-11-18

## 2024-11-18 DIAGNOSIS — H35.713: ICD-10-CM

## 2024-11-18 DIAGNOSIS — H35.3132: ICD-10-CM

## 2024-11-18 DIAGNOSIS — H35.051: ICD-10-CM

## 2024-11-18 DIAGNOSIS — H43.393: ICD-10-CM

## 2024-11-18 PROCEDURE — 92134 CPTRZ OPH DX IMG PST SGM RTA: CPT

## 2024-11-18 PROCEDURE — 92014 COMPRE OPH EXAM EST PT 1/>: CPT

## 2024-11-18 PROCEDURE — 92202 OPSCPY EXTND ON/MAC DRAW: CPT

## 2024-11-18 ASSESSMENT — VISUAL ACUITY
OD_CC: 20/20
OS_CC: 20/20

## 2024-11-18 ASSESSMENT — TONOMETRY
OD_IOP_MMHG: 20
OS_IOP_MMHG: 19

## 2025-04-15 ENCOUNTER — APPOINTMENT (OUTPATIENT)
Dept: URBAN - METROPOLITAN AREA CLINIC 37 | Facility: CLINIC | Age: 55
Setting detail: DERMATOLOGY
End: 2025-04-15

## 2025-04-15 DIAGNOSIS — Z80.8 FAMILY HISTORY OF MALIGNANT NEOPLASM OF OTHER ORGANS OR SYSTEMS: ICD-10-CM

## 2025-04-15 DIAGNOSIS — D22 MELANOCYTIC NEVI: ICD-10-CM

## 2025-04-15 DIAGNOSIS — L57.8 OTHER SKIN CHANGES DUE TO CHRONIC EXPOSURE TO NONIONIZING RADIATION: ICD-10-CM

## 2025-04-15 PROBLEM — D22.5 MELANOCYTIC NEVI OF TRUNK: Status: ACTIVE | Noted: 2025-04-15

## 2025-04-15 PROCEDURE — ? COUNSELING

## 2025-04-15 PROCEDURE — ? TREATMENT REGIMEN

## 2025-04-15 PROCEDURE — 99213 OFFICE O/P EST LOW 20 MIN: CPT

## 2025-04-15 ASSESSMENT — LOCATION SIMPLE DESCRIPTION DERM
LOCATION SIMPLE: CHEST
LOCATION SIMPLE: LEFT UPPER BACK

## 2025-04-15 ASSESSMENT — LOCATION ZONE DERM: LOCATION ZONE: TRUNK

## 2025-04-15 ASSESSMENT — LOCATION DETAILED DESCRIPTION DERM
LOCATION DETAILED: LEFT MID-UPPER BACK
LOCATION DETAILED: UPPER STERNUM

## 2025-04-21 ENCOUNTER — FOLLOW UP (OUTPATIENT)
Dept: URBAN - METROPOLITAN AREA CLINIC 101 | Facility: CLINIC | Age: 55
End: 2025-04-21

## 2025-04-21 DIAGNOSIS — H35.3132: ICD-10-CM

## 2025-04-21 DIAGNOSIS — H35.713: ICD-10-CM

## 2025-04-21 DIAGNOSIS — H35.051: ICD-10-CM

## 2025-04-21 DIAGNOSIS — H43.393: ICD-10-CM

## 2025-04-21 PROCEDURE — PFS EYLEA PFS: Mod: JZ

## 2025-04-21 PROCEDURE — 67028 INJECTION EYE DRUG: CPT

## 2025-04-21 PROCEDURE — 92202 OPSCPY EXTND ON/MAC DRAW: CPT | Mod: 59

## 2025-04-21 PROCEDURE — 92014 COMPRE OPH EXAM EST PT 1/>: CPT | Mod: 25

## 2025-04-21 PROCEDURE — 92134 CPTRZ OPH DX IMG PST SGM RTA: CPT

## 2025-04-21 ASSESSMENT — VISUAL ACUITY
OD_CC: 20/25-1
OS_CC: 20/20

## 2025-04-21 ASSESSMENT — TONOMETRY
OD_IOP_MMHG: 16
OS_IOP_MMHG: 15

## 2025-08-08 ENCOUNTER — FOLLOW UP (OUTPATIENT)
Dept: URBAN - METROPOLITAN AREA CLINIC 101 | Facility: CLINIC | Age: 55
End: 2025-08-08

## 2025-08-08 DIAGNOSIS — H35.3211: ICD-10-CM

## 2025-08-08 DIAGNOSIS — H35.713: ICD-10-CM

## 2025-08-08 DIAGNOSIS — H43.393: ICD-10-CM

## 2025-08-08 DIAGNOSIS — H35.051: ICD-10-CM

## 2025-08-08 DIAGNOSIS — H35.3122: ICD-10-CM

## 2025-08-08 PROCEDURE — 92202 OPSCPY EXTND ON/MAC DRAW: CPT | Mod: 59

## 2025-08-08 PROCEDURE — 92014 COMPRE OPH EXAM EST PT 1/>: CPT | Mod: 25

## 2025-08-08 PROCEDURE — PFS EYLEA PFS: Mod: JZ

## 2025-08-08 PROCEDURE — 92134 CPTRZ OPH DX IMG PST SGM RTA: CPT

## 2025-08-08 PROCEDURE — 67028 INJECTION EYE DRUG: CPT

## 2025-08-08 ASSESSMENT — VISUAL ACUITY
OD_CC: 20/25
OS_CC: 20/20

## 2025-08-08 ASSESSMENT — TONOMETRY
OS_IOP_MMHG: 14
OD_IOP_MMHG: 15